# Patient Record
Sex: MALE | Race: WHITE | NOT HISPANIC OR LATINO | URBAN - METROPOLITAN AREA
[De-identification: names, ages, dates, MRNs, and addresses within clinical notes are randomized per-mention and may not be internally consistent; named-entity substitution may affect disease eponyms.]

---

## 2017-06-28 NOTE — H&P ADULT - ASSESSMENT
This is a 51 year old male with Hx of   CAD, S/P CABG, PCI, in stent restenosis,  DM2, HLD, HTN presented to cardiologist with intermittent CP not related to exertion and dyspnea with moderate exertion. Recent nuclear stress test suggestive of apical/anterior wall ischemia.  Patient presents now for an angiogram with possible intervention.  - risk/ benefits discussed  - informed consent obtained

## 2017-06-28 NOTE — H&P ADULT - HISTORY OF PRESENT ILLNESS
51 year old  CAD, S/P CABG, PCI, in stent restenosis,  DM2, HLD, HTN presented to cardiologist with intermittent CP not related to exertion and dyspnea with moderate exertion. Recent nuclear stress test suggestive of apical wall ischemia. This is a 51 year old male with Hx of   CAD, S/P CABG, PCI, in stent restenosis,  DM2, HLD, HTN presented to cardiologist with intermittent CP not related to exertion and dyspnea with moderate exertion. Recent nuclear stress test suggestive of apical/anterior wall ischemia.

## 2017-06-29 ENCOUNTER — OUTPATIENT (OUTPATIENT)
Dept: OUTPATIENT SERVICES | Facility: HOSPITAL | Age: 52
LOS: 1 days | Discharge: HOME | End: 2017-06-29
Payer: COMMERCIAL

## 2017-06-29 VITALS
OXYGEN SATURATION: 99 % | SYSTOLIC BLOOD PRESSURE: 113 MMHG | DIASTOLIC BLOOD PRESSURE: 51 MMHG | RESPIRATION RATE: 16 BRPM | HEART RATE: 65 BPM

## 2017-06-29 VITALS
DIASTOLIC BLOOD PRESSURE: 87 MMHG | SYSTOLIC BLOOD PRESSURE: 132 MMHG | OXYGEN SATURATION: 100 % | TEMPERATURE: 98 F | HEART RATE: 77 BPM | RESPIRATION RATE: 16 BRPM

## 2017-06-29 DIAGNOSIS — Z95.1 PRESENCE OF AORTOCORONARY BYPASS GRAFT: Chronic | ICD-10-CM

## 2017-06-29 LAB
ANION GAP SERPL CALC-SCNC: 7 MMOL/L — SIGNIFICANT CHANGE UP (ref 5–17)
BUN SERPL-MCNC: 13 MG/DL — SIGNIFICANT CHANGE UP (ref 7–23)
CALCIUM SERPL-MCNC: 9.4 MG/DL — SIGNIFICANT CHANGE UP (ref 8.5–10.1)
CHLORIDE SERPL-SCNC: 106 MMOL/L — SIGNIFICANT CHANGE UP (ref 96–108)
CO2 SERPL-SCNC: 27 MMOL/L — SIGNIFICANT CHANGE UP (ref 22–31)
CREAT SERPL-MCNC: 1.05 MG/DL — SIGNIFICANT CHANGE UP (ref 0.5–1.3)
GLUCOSE SERPL-MCNC: 275 MG/DL — HIGH (ref 70–99)
HCT VFR BLD CALC: 46.4 % — SIGNIFICANT CHANGE UP (ref 39–50)
HGB BLD-MCNC: 16.2 G/DL — SIGNIFICANT CHANGE UP (ref 13–17)
MCHC RBC-ENTMCNC: 29.4 PG — SIGNIFICANT CHANGE UP (ref 27–34)
MCHC RBC-ENTMCNC: 35 GM/DL — SIGNIFICANT CHANGE UP (ref 32–36)
MCV RBC AUTO: 84.1 FL — SIGNIFICANT CHANGE UP (ref 80–100)
PLATELET # BLD AUTO: 168 K/UL — SIGNIFICANT CHANGE UP (ref 150–400)
POTASSIUM SERPL-MCNC: 4.1 MMOL/L — SIGNIFICANT CHANGE UP (ref 3.5–5.3)
POTASSIUM SERPL-SCNC: 4.1 MMOL/L — SIGNIFICANT CHANGE UP (ref 3.5–5.3)
RBC # BLD: 5.52 M/UL — SIGNIFICANT CHANGE UP (ref 4.2–5.8)
RBC # FLD: 11.1 % — SIGNIFICANT CHANGE UP (ref 10.3–14.5)
SODIUM SERPL-SCNC: 140 MMOL/L — SIGNIFICANT CHANGE UP (ref 135–145)
WBC # BLD: 5.9 K/UL — SIGNIFICANT CHANGE UP (ref 3.8–10.5)
WBC # FLD AUTO: 5.9 K/UL — SIGNIFICANT CHANGE UP (ref 3.8–10.5)

## 2017-06-29 PROCEDURE — 93010 ELECTROCARDIOGRAM REPORT: CPT

## 2017-06-29 RX ORDER — EZETIMIBE 10 MG/1
1 TABLET ORAL
Qty: 0 | Refills: 0 | COMMUNITY

## 2017-06-29 RX ORDER — CLOPIDOGREL BISULFATE 75 MG/1
1 TABLET, FILM COATED ORAL
Qty: 0 | Refills: 0 | COMMUNITY

## 2017-06-29 RX ORDER — ASPIRIN/CALCIUM CARB/MAGNESIUM 324 MG
1 TABLET ORAL
Qty: 0 | Refills: 0 | COMMUNITY

## 2017-06-29 RX ORDER — METFORMIN HYDROCHLORIDE 850 MG/1
1 TABLET ORAL
Qty: 0 | Refills: 0 | COMMUNITY

## 2017-06-29 NOTE — PROGRESS NOTE ADULT - SUBJECTIVE AND OBJECTIVE BOX
Cardiology NP     Patient is a 51y old  Male who presents with a chief complaint of Patient with c/o chest pressure and SOB on exertion. (28 Jun 2017 19:07)      HPI:  This is a 51 year old male with Hx of   CAD, S/P CABG, PCI, in stent restenosis,  DM2, HLD, HTN presented to cardiologist with intermittent CP not related to exertion and dyspnea with moderate exertion. Recent nuclear stress test suggestive of apical/anterior wall ischemia. (28 Jun 2017 19:07)      PAST MEDICAL & SURGICAL HISTORY:  MI, old  CAD (coronary artery disease)  Hypertension  HLD (hyperlipidemia)  S/P CABG (coronary artery bypass graft)      MEDICATIONS  (STANDING):    MEDICATIONS  (PRN):      Allergies    No Known Allergies    Intolerances        REVIEW OF SYSTEMS: As mentioned in HPI all others Negative     Vital Signs Last 24 Hrs  T(C): 36.9 (29 Jun 2017 07:43), Max: 36.9 (29 Jun 2017 07:37)  T(F): 98.4 (29 Jun 2017 07:43), Max: 98.4 (29 Jun 2017 07:37)  HR: 72 (29 Jun 2017 07:43) (72 - 77)  BP: 132/87 (29 Jun 2017 07:43) (132/87 - 132/87)  BP(mean): --  RR: 16 (29 Jun 2017 07:43) (16 - 16)  SpO2: 99% (29 Jun 2017 07:43) (99% - 100%)    PHYSICAL EXAM:  NERVOUS SYSTEM:  Alert & Oriented X3, Good concentration  CHEST/LUNG: Clear to auscultation bilaterally; No rales, rhonchi, wheezing, or rubs  HEART: Regular rate and rhythm; No murmurs, rubs, or gallops  ABDOMEN: Soft, Nontender, Nondistended; Bowel sounds present  EXTREMITIES:  2+ Peripheral Pulses, No clubbing, cyanosis, or edema  SKIN: right femoral cath site with Perclose device, site without bleeding or hematoma    LABS:                        16.2   5.9   )-----------( 168      ( 29 Jun 2017 07:15 )             46.4     06-29    140  |  106  |  13  ----------------------------<  275<H>  4.1   |  27  |  1.05    Ca    9.4      29 Jun 2017 07:15

## 2017-06-29 NOTE — PROGRESS NOTE ADULT - ASSESSMENT
Patient now s/p angiogram that revealed non obstructive disease.  - medical management  - DC home  - f/u with Dr Gannon in 1-2 weeks

## 2017-07-01 DIAGNOSIS — E11.9 TYPE 2 DIABETES MELLITUS WITHOUT COMPLICATIONS: ICD-10-CM

## 2017-07-01 DIAGNOSIS — Z95.5 PRESENCE OF CORONARY ANGIOPLASTY IMPLANT AND GRAFT: ICD-10-CM

## 2017-07-01 DIAGNOSIS — I10 ESSENTIAL (PRIMARY) HYPERTENSION: ICD-10-CM

## 2017-07-01 DIAGNOSIS — I25.10 ATHEROSCLEROTIC HEART DISEASE OF NATIVE CORONARY ARTERY WITHOUT ANGINA PECTORIS: ICD-10-CM

## 2017-07-01 DIAGNOSIS — I25.2 OLD MYOCARDIAL INFARCTION: ICD-10-CM

## 2017-07-01 DIAGNOSIS — E78.5 HYPERLIPIDEMIA, UNSPECIFIED: ICD-10-CM

## 2017-07-01 DIAGNOSIS — R07.9 CHEST PAIN, UNSPECIFIED: ICD-10-CM

## 2017-07-01 DIAGNOSIS — Z79.02 LONG TERM (CURRENT) USE OF ANTITHROMBOTICS/ANTIPLATELETS: ICD-10-CM

## 2017-07-01 DIAGNOSIS — E78.1 PURE HYPERGLYCERIDEMIA: ICD-10-CM

## 2017-07-01 DIAGNOSIS — E78.00 PURE HYPERCHOLESTEROLEMIA, UNSPECIFIED: ICD-10-CM

## 2017-07-01 DIAGNOSIS — Z79.84 LONG TERM (CURRENT) USE OF ORAL HYPOGLYCEMIC DRUGS: ICD-10-CM

## 2017-07-01 DIAGNOSIS — Z95.1 PRESENCE OF AORTOCORONARY BYPASS GRAFT: ICD-10-CM
